# Patient Record
Sex: FEMALE | Race: WHITE | NOT HISPANIC OR LATINO | Employment: FULL TIME | URBAN - NONMETROPOLITAN AREA
[De-identification: names, ages, dates, MRNs, and addresses within clinical notes are randomized per-mention and may not be internally consistent; named-entity substitution may affect disease eponyms.]

---

## 2023-03-30 ENCOUNTER — TELEPHONE (OUTPATIENT)
Dept: PRIMARY CARE | Facility: CLINIC | Age: 23
End: 2023-03-30
Payer: COMMERCIAL

## 2023-03-30 NOTE — TELEPHONE ENCOUNTER
LAST COUPLE MONTHS  GET LIGHT HEADED ABOUT 1 WEEK PRIOR TO CYCLE AND LAST THROUGH END OF CYCLE.  PERIODS ARE HEAVIER AND HAS NAUSEA.  NAUSEA IS BAD ENOUGH SHE THROWS UP.       APPT ?

## 2023-04-02 PROBLEM — K59.04 CHRONIC IDIOPATHIC CONSTIPATION: Status: ACTIVE | Noted: 2023-04-02

## 2023-04-02 PROBLEM — R53.83 FATIGUE: Status: ACTIVE | Noted: 2023-04-02

## 2023-04-02 PROBLEM — G43.109 MIGRAINE WITH AURA AND WITHOUT STATUS MIGRAINOSUS, NOT INTRACTABLE: Status: ACTIVE | Noted: 2023-04-02

## 2023-04-02 PROBLEM — F32.A ANXIETY AND DEPRESSION: Status: ACTIVE | Noted: 2023-04-02

## 2023-04-02 PROBLEM — M25.532 LEFT WRIST PAIN: Status: ACTIVE | Noted: 2023-04-02

## 2023-04-02 PROBLEM — F41.9 ANXIETY AND DEPRESSION: Status: ACTIVE | Noted: 2023-04-02

## 2023-04-02 PROBLEM — R51.9 INTRACTABLE HEADACHE: Status: ACTIVE | Noted: 2023-04-02

## 2023-04-02 PROBLEM — R55 VASOVAGAL NEAR SYNCOPE: Status: ACTIVE | Noted: 2023-04-02

## 2023-04-02 PROBLEM — N93.9 ABNORMAL UTERINE BLEEDING: Status: ACTIVE | Noted: 2023-04-02

## 2023-04-02 PROBLEM — N97.9 INFERTILITY, FEMALE: Status: ACTIVE | Noted: 2023-04-02

## 2023-04-02 PROBLEM — T78.40XA ALLERGIC REACTION: Status: ACTIVE | Noted: 2023-04-02

## 2023-04-02 RX ORDER — RIZATRIPTAN BENZOATE 10 MG/1
10 TABLET ORAL
COMMUNITY
Start: 2021-03-19

## 2023-04-02 RX ORDER — TOPIRAMATE 25 MG/1
1 TABLET ORAL 2 TIMES DAILY
COMMUNITY
Start: 2023-01-13 | End: 2023-04-03 | Stop reason: ALTCHOICE

## 2023-04-02 RX ORDER — EPINEPHRINE 0.3 MG/.3ML
INJECTION SUBCUTANEOUS
COMMUNITY
Start: 2022-01-30

## 2023-04-02 RX ORDER — LORAZEPAM 0.5 MG/1
1 TABLET ORAL 3 TIMES DAILY PRN
COMMUNITY
Start: 2022-02-11 | End: 2023-04-03 | Stop reason: ALTCHOICE

## 2023-04-02 RX ORDER — SERTRALINE HYDROCHLORIDE 50 MG/1
1 TABLET, FILM COATED ORAL DAILY
COMMUNITY
Start: 2022-02-11 | End: 2023-04-03 | Stop reason: ALTCHOICE

## 2023-04-02 RX ORDER — MELOXICAM 15 MG/1
1 TABLET ORAL DAILY
COMMUNITY
Start: 2022-11-04 | End: 2023-04-03 | Stop reason: ALTCHOICE

## 2023-04-03 ENCOUNTER — OFFICE VISIT (OUTPATIENT)
Dept: PRIMARY CARE | Facility: CLINIC | Age: 23
End: 2023-04-03
Payer: COMMERCIAL

## 2023-04-03 ENCOUNTER — LAB (OUTPATIENT)
Dept: LAB | Facility: LAB | Age: 23
End: 2023-04-03
Payer: COMMERCIAL

## 2023-04-03 VITALS
DIASTOLIC BLOOD PRESSURE: 80 MMHG | OXYGEN SATURATION: 99 % | WEIGHT: 161.7 LBS | SYSTOLIC BLOOD PRESSURE: 138 MMHG | BODY MASS INDEX: 32.6 KG/M2 | HEART RATE: 94 BPM | HEIGHT: 59 IN

## 2023-04-03 DIAGNOSIS — R11.2 NAUSEA AND VOMITING, UNSPECIFIED VOMITING TYPE: ICD-10-CM

## 2023-04-03 DIAGNOSIS — R42 DIZZINESS: ICD-10-CM

## 2023-04-03 DIAGNOSIS — R63.5 WEIGHT GAIN: ICD-10-CM

## 2023-04-03 DIAGNOSIS — R42 DIZZINESS: Primary | ICD-10-CM

## 2023-04-03 DIAGNOSIS — N92.0 MENORRHAGIA WITH REGULAR CYCLE: ICD-10-CM

## 2023-04-03 PROBLEM — G43.109 MIGRAINE WITH AURA AND WITHOUT STATUS MIGRAINOSUS, NOT INTRACTABLE: Status: RESOLVED | Noted: 2023-04-02 | Resolved: 2023-04-03

## 2023-04-03 PROBLEM — R51.9 INTRACTABLE HEADACHE: Status: RESOLVED | Noted: 2023-04-02 | Resolved: 2023-04-03

## 2023-04-03 PROBLEM — R53.83 FATIGUE: Status: RESOLVED | Noted: 2023-04-02 | Resolved: 2023-04-03

## 2023-04-03 PROBLEM — M25.532 LEFT WRIST PAIN: Status: RESOLVED | Noted: 2023-04-02 | Resolved: 2023-04-03

## 2023-04-03 LAB
ALANINE AMINOTRANSFERASE (SGPT) (U/L) IN SER/PLAS: 10 U/L (ref 7–45)
ALBUMIN (G/DL) IN SER/PLAS: 4.6 G/DL (ref 3.4–5)
ALKALINE PHOSPHATASE (U/L) IN SER/PLAS: 89 U/L (ref 33–110)
ANION GAP IN SER/PLAS: 16 MMOL/L (ref 10–20)
ASPARTATE AMINOTRANSFERASE (SGOT) (U/L) IN SER/PLAS: 14 U/L (ref 9–39)
BASOPHILS (10*3/UL) IN BLOOD BY AUTOMATED COUNT: 0.04 X10E9/L (ref 0–0.1)
BASOPHILS/100 LEUKOCYTES IN BLOOD BY AUTOMATED COUNT: 0.5 % (ref 0–2)
BILIRUBIN TOTAL (MG/DL) IN SER/PLAS: 0.3 MG/DL (ref 0–1.2)
CALCIUM (MG/DL) IN SER/PLAS: 9.5 MG/DL (ref 8.6–10.3)
CARBON DIOXIDE, TOTAL (MMOL/L) IN SER/PLAS: 26 MMOL/L (ref 21–32)
CHLORIDE (MMOL/L) IN SER/PLAS: 102 MMOL/L (ref 98–107)
CREATININE (MG/DL) IN SER/PLAS: 0.52 MG/DL (ref 0.5–1.05)
EOSINOPHILS (10*3/UL) IN BLOOD BY AUTOMATED COUNT: 0.19 X10E9/L (ref 0–0.7)
EOSINOPHILS/100 LEUKOCYTES IN BLOOD BY AUTOMATED COUNT: 2.3 % (ref 0–6)
ERYTHROCYTE DISTRIBUTION WIDTH (RATIO) BY AUTOMATED COUNT: 15.9 % (ref 11.5–14.5)
ERYTHROCYTE MEAN CORPUSCULAR HEMOGLOBIN CONCENTRATION (G/DL) BY AUTOMATED: 29.3 G/DL (ref 32–36)
ERYTHROCYTE MEAN CORPUSCULAR VOLUME (FL) BY AUTOMATED COUNT: 68 FL (ref 80–100)
ERYTHROCYTES (10*6/UL) IN BLOOD BY AUTOMATED COUNT: 6.4 X10E12/L (ref 4–5.2)
FERRITIN (UG/LL) IN SER/PLAS: 39 UG/L (ref 8–150)
GFR FEMALE: >90 ML/MIN/1.73M2
GLUCOSE (MG/DL) IN SER/PLAS: 82 MG/DL (ref 74–99)
HEMATOCRIT (%) IN BLOOD BY AUTOMATED COUNT: 43.4 % (ref 36–46)
HEMOGLOBIN (G/DL) IN BLOOD: 12.7 G/DL (ref 12–16)
IMMATURE GRANULOCYTES/100 LEUKOCYTES IN BLOOD BY AUTOMATED COUNT: 0.4 % (ref 0–0.9)
IRON (UG/DL) IN SER/PLAS: 31 UG/DL (ref 35–150)
IRON BINDING CAPACITY (UG/DL) IN SER/PLAS: 418 UG/DL (ref 240–445)
IRON SATURATION (%) IN SER/PLAS: 7 % (ref 25–45)
LEUKOCYTES (10*3/UL) IN BLOOD BY AUTOMATED COUNT: 8.2 X10E9/L (ref 4.4–11.3)
LYMPHOCYTES (10*3/UL) IN BLOOD BY AUTOMATED COUNT: 2.45 X10E9/L (ref 1.2–4.8)
LYMPHOCYTES/100 LEUKOCYTES IN BLOOD BY AUTOMATED COUNT: 30 % (ref 13–44)
MONOCYTES (10*3/UL) IN BLOOD BY AUTOMATED COUNT: 0.44 X10E9/L (ref 0.1–1)
MONOCYTES/100 LEUKOCYTES IN BLOOD BY AUTOMATED COUNT: 5.4 % (ref 2–10)
NEUTROPHILS (10*3/UL) IN BLOOD BY AUTOMATED COUNT: 5.03 X10E9/L (ref 1.2–7.7)
NEUTROPHILS/100 LEUKOCYTES IN BLOOD BY AUTOMATED COUNT: 61.4 % (ref 40–80)
PLATELETS (10*3/UL) IN BLOOD AUTOMATED COUNT: 371 X10E9/L (ref 150–450)
POTASSIUM (MMOL/L) IN SER/PLAS: 4.6 MMOL/L (ref 3.5–5.3)
PROTEIN TOTAL: 6.9 G/DL (ref 6.4–8.2)
SODIUM (MMOL/L) IN SER/PLAS: 139 MMOL/L (ref 136–145)
THYROTROPIN (MIU/L) IN SER/PLAS BY DETECTION LIMIT <= 0.05 MIU/L: 2.55 MIU/L (ref 0.44–3.98)
UREA NITROGEN (MG/DL) IN SER/PLAS: 13 MG/DL (ref 6–23)

## 2023-04-03 PROCEDURE — 1036F TOBACCO NON-USER: CPT | Performed by: NURSE PRACTITIONER

## 2023-04-03 PROCEDURE — 83540 ASSAY OF IRON: CPT

## 2023-04-03 PROCEDURE — 80053 COMPREHEN METABOLIC PANEL: CPT

## 2023-04-03 PROCEDURE — 84443 ASSAY THYROID STIM HORMONE: CPT

## 2023-04-03 PROCEDURE — 85025 COMPLETE CBC W/AUTO DIFF WBC: CPT

## 2023-04-03 PROCEDURE — 82728 ASSAY OF FERRITIN: CPT

## 2023-04-03 PROCEDURE — 99214 OFFICE O/P EST MOD 30 MIN: CPT | Performed by: NURSE PRACTITIONER

## 2023-04-03 PROCEDURE — 36415 COLL VENOUS BLD VENIPUNCTURE: CPT

## 2023-04-03 PROCEDURE — 83550 IRON BINDING TEST: CPT

## 2023-04-03 ASSESSMENT — PATIENT HEALTH QUESTIONNAIRE - PHQ9
1. LITTLE INTEREST OR PLEASURE IN DOING THINGS: NOT AT ALL
SUM OF ALL RESPONSES TO PHQ9 QUESTIONS 1 AND 2: 0
2. FEELING DOWN, DEPRESSED OR HOPELESS: NOT AT ALL

## 2023-04-03 ASSESSMENT — ENCOUNTER SYMPTOMS
DYSURIA: 0
HEADACHES: 0
FREQUENCY: 1
WEAKNESS: 1
DIZZINESS: 1
LIGHT-HEADEDNESS: 1

## 2023-04-03 NOTE — PROGRESS NOTES
"Subjective   Patient ID: Janae RUCKER Brideweser is a 23 y.o. female who presents for Dizziness (Nausea x 1 mo. Worsens during menstrual cycle ) and Menorrhagia (X 6 mo. ).    Janae comes to the office to discuss dizziness and lightheadedness.  LH mostly in AM & w/ rapid position chgs since starting period last week. LMP: last Tues & finished yesterday. No syncopal episodes/chest pains/palpations. Feels weak in her legs.  Menses regular x 4-5D, menses heavy the first 3 days. Wears both a tampon & a pad @ same time. Chging tampon every 2H w/ infreq. Pad chgs + clots. + abd cramping. OTC: Midol. These periods have gotten more heavy over last year. Wt gain: 20-30#, no diet or physical activity chgs. No abd pain/melena. No dysuria. Drinking plenty of fluids. Dizziness & LH are worse before and while she is on her menstrual cycle.  Denies pregnancy. + sexually active. This is accompanied with nausea and had one emesis. Infreq. red meat consumption. No vitamins. Not wanting to start on OCP's.  Last Pap 2021 normal      Dizziness  Associated symptoms include weakness. Pertinent negatives include no headaches.        Review of Systems   Gastrointestinal:         + Abdominal cramping with menses   Genitourinary:  Positive for frequency and menstrual problem. Negative for dysuria and vaginal pain.   Neurological:  Positive for dizziness, weakness and light-headedness. Negative for headaches.       Objective   /80   Pulse 94   Ht 1.499 m (4' 11\")   Wt 73.3 kg (161 lb 11.2 oz)   LMP 03/28/2023 (Exact Date)   SpO2 99%   BMI 32.66 kg/m²     Physical Exam  Vitals and nursing note reviewed.   Constitutional:       Appearance: Normal appearance.   HENT:      Head: Normocephalic.   Cardiovascular:      Rate and Rhythm: Normal rate and regular rhythm.      Heart sounds: Normal heart sounds.   Pulmonary:      Effort: Pulmonary effort is normal.      Breath sounds: Normal breath sounds.   Abdominal:      General: There is no " distension.      Palpations: There is no mass.      Tenderness: There is no abdominal tenderness. There is no guarding or rebound.   Musculoskeletal:      Cervical back: Normal range of motion.   Skin:     General: Skin is warm and dry.   Neurological:      General: No focal deficit present.      Mental Status: She is alert and oriented to person, place, and time.         Assessment/Plan   Problem List Items Addressed This Visit          Digestive    Nausea and vomiting       Genitourinary    Menorrhagia with regular cycle       Endocrine/Metabolic    Weight gain    Relevant Orders    TSH with reflex to Free T4 if abnormal       Other    Dizziness - Primary    Relevant Orders    Ferritin    Iron and TIBC    CBC and Auto Differential    TSH with reflex to Free T4 if abnormal

## 2023-04-04 ENCOUNTER — TELEPHONE (OUTPATIENT)
Dept: PRIMARY CARE | Facility: CLINIC | Age: 23
End: 2023-04-04
Payer: COMMERCIAL

## 2023-04-05 ENCOUNTER — TELEPHONE (OUTPATIENT)
Dept: PRIMARY CARE | Facility: CLINIC | Age: 23
End: 2023-04-05
Payer: COMMERCIAL

## 2023-04-05 NOTE — RESULT ENCOUNTER NOTE
Call & notify patient bloodwork doesn't show any true anemia but iron studies are slightly on the low side. Have her start Slow Fe (OTC) daily x3 months. Schedule an ov in 3 months for re-evaluation or sooner if needed. We will repeat iron studies in 3 MO

## 2023-04-05 NOTE — TELEPHONE ENCOUNTER
----- Message from JOSE Peng sent at 4/5/2023  7:17 AM EDT -----  Call & notify patient bloodwork doesn't show any true anemia but iron studies are slightly on the low side. Have her start Slow Fe (OTC) daily x3 months. Schedule an ov in 3 months for re-evaluation or sooner if needed. We will repeat iron studies in 3 MO

## 2023-05-18 ENCOUNTER — TELEPHONE (OUTPATIENT)
Dept: PRIMARY CARE | Facility: CLINIC | Age: 23
End: 2023-05-18
Payer: COMMERCIAL

## 2023-05-18 NOTE — TELEPHONE ENCOUNTER
IS TAKING OTC IRON 45MG DAILY. ([employer]) COMPLAINS OF SEVERE COTTON MOUTH NOT RELIEVED BY INCREASING FLUIDS. HAS NOT TRIED ANOTHER BRAND. PLEASE ADVISE. IS NOT HAVING ANY MORE LIGHTHEADEDNESS

## 2023-10-20 ENCOUNTER — APPOINTMENT (OUTPATIENT)
Dept: URGENT CARE | Facility: CLINIC | Age: 23
End: 2023-10-20

## 2023-12-29 ENCOUNTER — HOSPITAL ENCOUNTER (EMERGENCY)
Facility: HOSPITAL | Age: 23
Discharge: HOME | End: 2023-12-29
Attending: EMERGENCY MEDICINE

## 2023-12-29 VITALS
SYSTOLIC BLOOD PRESSURE: 120 MMHG | HEIGHT: 59 IN | OXYGEN SATURATION: 100 % | HEART RATE: 96 BPM | WEIGHT: 160 LBS | RESPIRATION RATE: 16 BRPM | TEMPERATURE: 97.1 F | DIASTOLIC BLOOD PRESSURE: 80 MMHG | BODY MASS INDEX: 32.25 KG/M2

## 2023-12-29 DIAGNOSIS — J02.9 SORE THROAT: Primary | ICD-10-CM

## 2023-12-29 LAB
FLUAV RNA RESP QL NAA+PROBE: NOT DETECTED
FLUBV RNA RESP QL NAA+PROBE: NOT DETECTED
S PYO DNA THROAT QL NAA+PROBE: NOT DETECTED
SARS-COV-2 RNA RESP QL NAA+PROBE: NOT DETECTED
TSH SERPL-ACNC: 2.35 MIU/L (ref 0.44–3.98)

## 2023-12-29 PROCEDURE — 99283 EMERGENCY DEPT VISIT LOW MDM: CPT | Performed by: EMERGENCY MEDICINE

## 2023-12-29 PROCEDURE — 87502 INFLUENZA DNA AMP PROBE: CPT | Performed by: EMERGENCY MEDICINE

## 2023-12-29 PROCEDURE — 36415 COLL VENOUS BLD VENIPUNCTURE: CPT | Performed by: EMERGENCY MEDICINE

## 2023-12-29 PROCEDURE — 84443 ASSAY THYROID STIM HORMONE: CPT | Performed by: EMERGENCY MEDICINE

## 2023-12-29 PROCEDURE — 87651 STREP A DNA AMP PROBE: CPT | Performed by: EMERGENCY MEDICINE

## 2023-12-29 ASSESSMENT — PAIN SCALES - GENERAL
PAINLEVEL_OUTOF10: 4
PAINLEVEL_OUTOF10: 4

## 2023-12-29 ASSESSMENT — COLUMBIA-SUICIDE SEVERITY RATING SCALE - C-SSRS
6. HAVE YOU EVER DONE ANYTHING, STARTED TO DO ANYTHING, OR PREPARED TO DO ANYTHING TO END YOUR LIFE?: NO
2. HAVE YOU ACTUALLY HAD ANY THOUGHTS OF KILLING YOURSELF?: NO
1. IN THE PAST MONTH, HAVE YOU WISHED YOU WERE DEAD OR WISHED YOU COULD GO TO SLEEP AND NOT WAKE UP?: NO

## 2023-12-29 ASSESSMENT — PAIN - FUNCTIONAL ASSESSMENT: PAIN_FUNCTIONAL_ASSESSMENT: 0-10

## 2023-12-29 ASSESSMENT — PAIN DESCRIPTION - LOCATION: LOCATION: THROAT

## 2023-12-29 NOTE — ED PROVIDER NOTES
HPI   No chief complaint on file.      Limitations to History: None    HPI: 23-year-old female presents with left-sided throat pain.  States this began last night.  States it is more tender to the touch.  States she might had a sore throat with swallowing last night but this is resolved this morning.  Denies any fevers, chills, nausea, vomiting, chest pain, shortness of breath, abdominal pain, urinary symptoms.    ------------------------------------------------------------------------------------------------------------------------------------------  Physical Exam:    VS: As documented in the triage note and EMR flowsheet from this visit were reviewed.    Appearance: Alert. cooperative,  in no acute distress.   Skin: Intact,  dry skin, no lesions, rash, petechiae or purpura.   Eyes: PERRLA, EOMs intact,  Conjunctiva pink with no redness or exudates.   HENT: Normocephalic, atraumatic. Nares patent. No intraoral lesions.   Neck: Supple, without meningismus. Trachea at midline. No lymphadenopathy.  Tenderness to palpation along the left anterior neck.  Pulmonary: Clear bilaterally with good chest wall excursion. No rales, rhonchi or wheezing. No accessory muscle use or stridor.  Cardiac: Tachycardic and regular rhythm, no rubs, murmurs, or gallops.   Musculoskeletal: Full range of motion.  Pulses full and equal. No cyanosis, clubbing, or edema.  Psychiatric: Appropriate mood and affect.                            No data recorded                Patient History   Past Medical History:   Diagnosis Date    Other conditions influencing health status 12/22/2021    Menarche     Past Surgical History:   Procedure Laterality Date    OTHER SURGICAL HISTORY  11/11/2019    Tonsillectomy with adenoidectomy    OTHER SURGICAL HISTORY  12/22/2021    Appendectomy    OTHER SURGICAL HISTORY  07/27/2020    Appendectomy laparoscopic     Family History   Problem Relation Name Age of Onset    Breast cancer Mother      Migraines Mother       Hypertension Mother          Benign    Hypertension Paternal Grandmother          Benign    Hypertension Paternal Grandfather          Benign    Breast cancer Other      Hypertension Other          Benign     Social History     Tobacco Use    Smoking status: Never    Smokeless tobacco: Never   Substance Use Topics    Alcohol use: Not on file    Drug use: Not on file       Physical Exam   ED Triage Vitals [12/29/23 0528]   Temp Heart Rate Resp BP   36.2 °C (97.1 °F) (!) 127 15 (!) 136/92      SpO2 Temp Source Heart Rate Source Patient Position   100 % Oral -- --      BP Location FiO2 (%)     -- --       Physical Exam    ED Course & MDM   Diagnoses as of 12/29/23 0741   Sore throat       Medical Decision Making  Labs Reviewed  GROUP A STREPTOCOCCUS, PCR - Normal     Group A Strep PCR                                 TSH WITH REFLEX TO FREE T4 IF ABNORMAL - Normal     Thyroid Stimulating Hormone   2.35                       Narrative: TSH testing is performed using different testing methodology at [facility] ([facility]) than at other [facility]. Direct result comparisons should only be made within the same method.                    SARS-COV-2 PCR, SYMPTOMATIC - Normal     Coronavirus 2019, PCR                                    Narrative: This assay has received FDA Emergency Use Authorization (EUA) and is only authorized for the duration of time that circumstances exist to justify the authorization of the emergency use of in vitro diagnostic tests for the detection of SARS-CoV-2 virus and/or diagnosis of COVID-19 infection under section 564(b)(1) of the Act, 21 U.S.C. 360bbb-3(b)(1). This assay is an in vitro diagnostic nucleic acid amplification test for the qualitative detection of SARS-CoV-2 from nasopharyngeal specimens and has been validated for use at [facility]. Negative results do not preclude COVID-19 infections and should not be used as the sole basis for diagnosis, treatment,  or other management decisions.                    INFLUENZA A AND B PCR - Normal    Medical Decision Making:    Appears well and nontoxic.  Patient initially tachycardic.  Afebrile.  No swelling of the posterior pharynx.  COVID, influenza, strep negative.  TSH normal.  Patient advised on Motrin and Tylenol as needed and follow-up with primary care.  Stable at time of discharge.    Differential Diagnoses Considered: Pharyngitis, musculoskeletal pain, PTA, thyroiditis, thyroid nodule    Escalation of Care:  Appropriate for discharge and follow-up with primary care.            Procedure  Procedures     Alvaro Pat,   12/29/23 0742

## 2024-01-03 ENCOUNTER — OFFICE VISIT (OUTPATIENT)
Dept: URGENT CARE | Facility: CLINIC | Age: 24
End: 2024-01-03

## 2024-01-03 VITALS
TEMPERATURE: 98 F | HEART RATE: 103 BPM | DIASTOLIC BLOOD PRESSURE: 81 MMHG | SYSTOLIC BLOOD PRESSURE: 114 MMHG | HEIGHT: 59 IN | BODY MASS INDEX: 32.25 KG/M2 | WEIGHT: 160 LBS | OXYGEN SATURATION: 95 % | RESPIRATION RATE: 16 BRPM

## 2024-01-03 DIAGNOSIS — J06.9 URI WITH COUGH AND CONGESTION: Primary | ICD-10-CM

## 2024-01-03 PROCEDURE — 99212 OFFICE O/P EST SF 10 MIN: CPT | Mod: 25 | Performed by: PHYSICIAN ASSISTANT

## 2024-01-03 RX ORDER — PSEUDOEPHEDRINE HCL 30 MG
30 TABLET ORAL EVERY 6 HOURS PRN
Qty: 30 TABLET | Refills: 0 | Status: SHIPPED | OUTPATIENT
Start: 2024-01-03 | End: 2025-01-02

## 2024-01-03 RX ORDER — AZELASTINE 1 MG/ML
2 SPRAY, METERED NASAL 2 TIMES DAILY
Qty: 30 ML | Refills: 0 | Status: SHIPPED | OUTPATIENT
Start: 2024-01-03 | End: 2024-01-18

## 2024-01-03 RX ORDER — PROMETHAZINE HYDROCHLORIDE AND DEXTROMETHORPHAN HYDROBROMIDE 6.25; 15 MG/5ML; MG/5ML
5 SYRUP ORAL 4 TIMES DAILY PRN
Qty: 118 ML | Refills: 0 | Status: SHIPPED | OUTPATIENT
Start: 2024-01-03 | End: 2024-01-10

## 2024-01-03 RX ORDER — ALBUTEROL SULFATE 90 UG/1
2 AEROSOL, METERED RESPIRATORY (INHALATION) EVERY 6 HOURS PRN
Qty: 18 G | Refills: 0 | Status: SHIPPED | OUTPATIENT
Start: 2024-01-03 | End: 2025-01-02

## 2024-01-03 ASSESSMENT — VISUAL ACUITY: OU: 1

## 2024-01-03 NOTE — PROGRESS NOTES
Located within [facility] [place] NOTE:      Name: Janae Maria, 23 y.o.    CSN:[id number]   MRN:[id number]    PCP: Rd Conde MD    ALL:  No Known Allergies    History:    Chief Complaint: URI (SORE THROAT, BI-LAT EAR PAIN, CONGESTION, PELVIC PAIN, NO APPETITE, NAUSEA WHEN EATING, FATIGUED X 5 DAYS)    Encounter Date: 1/3/2024  10:15hrs    HPI: The history was obtained from the patient. Janae is a 23 y.o. female, who presents with a chief complaint of URI (SORE THROAT, BI-LAT EAR PAIN, CONGESTION, PELVIC PAIN, NO APPETITE, NAUSEA WHEN EATING, FATIGUED X 5 DAYS)     Has been to two different clinics for similar symptoms, mentions she's only taking IBU for pain/discomfort & URI symptoms.    PMHx:    Past Medical History:   Diagnosis Date    Other conditions influencing health status 12/22/2021    Menarche              Current Outpatient Medications   Medication Sig Dispense Refill    ferrous sulfate (SLOW FE ORAL) Take 1 tablet by mouth every other day. TO EVERY THIRD DAY PRN TOLERATES      albuterol 90 mcg/actuation inhaler Inhale 2 puffs every 6 hours if needed for wheezing. 18 g 0    azelastine (Astelin) 137 mcg (0.1 %) nasal spray Administer 2 sprays into each nostril 2 times a day for 15 days. Use in each nostril as directed 30 mL 0    EPINEPHrine 0.3 mg/0.3 mL injection syringe EPINEPHrine 0.3 MG/0.3ML Injection Solution Auto-injector   Quantity: 2  Refills: 0        Start : 30-Jan-2022   Active      pseudoephedrine (Sudafed) 30 mg tablet Take 1 tablet (30 mg) by mouth every 6 hours if needed for congestion. 30 tablet 0    rizatriptan (Maxalt) 10 mg tablet Take 1 tablet (10 mg) by mouth. At onset of headache. May repeat every 2 hours as needed. Maximum 3 tablets in 24 hours       No current facility-administered medications for this visit.         PMSx:    Past Surgical History:   Procedure Laterality Date    OTHER SURGICAL HISTORY  11/11/2019    Tonsillectomy with adenoidectomy    OTHER  SURGICAL HISTORY  12/22/2021    Appendectomy    OTHER SURGICAL HISTORY  07/27/2020    Appendectomy laparoscopic       Fam Hx:   Family History   Problem Relation Name Age of Onset    Breast cancer Mother      Migraines Mother      Hypertension Mother          Benign    Hypertension Paternal Grandmother          Benign    Hypertension Paternal Grandfather          Benign    Breast cancer Other      Hypertension Other          Benign       SOC. Hx:     Social History     Socioeconomic History    Marital status: Single     Spouse name: Not on file    Number of children: Not on file    Years of education: Not on file    Highest education level: Not on file   Occupational History    Not on file   Tobacco Use    Smoking status: Never    Smokeless tobacco: Never   Substance and Sexual Activity    Alcohol use: Not on file    Drug use: Not on file    Sexual activity: Not on file   Other Topics Concern    Not on file   Social History Narrative    Not on file     Social Determinants of Health     Financial Resource Strain: Not on file   Food Insecurity: Not on file   Transportation Needs: Not on file   Physical Activity: Not on file   Stress: Not on file   Social Connections: Not on file   Intimate Partner Violence: Not on file   Housing Stability: Not on file         Vitals:    01/03/24 1005   BP: 114/81   Pulse: 103   Resp: 16   Temp: 36.7 °C (98 °F)   SpO2: 95%     72.6 kg (160 lb)          Physical Exam  Constitutional:       Appearance: Normal appearance. She is normal weight.   HENT:      Head: Normocephalic and atraumatic.      Right Ear: Hearing and tympanic membrane normal.      Left Ear: Hearing and tympanic membrane normal.      Nose: Congestion and rhinorrhea present. Rhinorrhea is clear.      Mouth/Throat:      Lips: Pink.      Mouth: Mucous membranes are moist.   Eyes:      General: Lids are normal. Vision grossly intact.      Extraocular Movements: Extraocular movements intact.   Cardiovascular:      Rate and  Rhythm: Regular rhythm. Tachycardia present.      Comments: Tachycardia may be related to symptoms of nausea decreased appetite   Pulmonary:      Effort: Pulmonary effort is normal.      Breath sounds: Normal breath sounds.   Abdominal:      General: Abdomen is flat.   Musculoskeletal:         General: Normal range of motion.      Cervical back: Normal range of motion and neck supple.   Skin:     General: Skin is warm.   Neurological:      Mental Status: She is alert and oriented to person, place, and time.   Psychiatric:         Behavior: Behavior normal.         LABORATORY @ RADIOLOGICAL IMAGING (if done):     Patient has not and is not interested in any additional tests that she has had a COVID test earlier at her previous clinic visits which was negative 12/29/23.     COURSE/MEDICAL DECISION MAKING:    Janae is a 23 y.o., who presents with a working diagnosis of   1. URI with cough and congestion     with a differential to include: Influenza, parainfluenza, rhinovirus, adenovirus, metapneumovirus, coronavirus, COVID-19, postnasal drip, strep pharyngitis, GERD, retropharyngeal abscess, tonsillitis, adenitis, seasonal allergies      1) URI with cough/congestion: supportive care recommended, discussed use of OTC analgesics APAP/NSAID for fever/pain control, discussed hydration & when to seek re-evaluation.         Rd Herndon PA-C   Advanced Practice Provider  [facility]

## 2024-02-25 ENCOUNTER — HOSPITAL ENCOUNTER (EMERGENCY)
Facility: HOSPITAL | Age: 24
Discharge: HOME | End: 2024-02-25

## 2024-02-25 VITALS
TEMPERATURE: 98.3 F | DIASTOLIC BLOOD PRESSURE: 110 MMHG | BODY MASS INDEX: 32.25 KG/M2 | WEIGHT: 160 LBS | HEIGHT: 59 IN | OXYGEN SATURATION: 99 % | RESPIRATION RATE: 16 BRPM | SYSTOLIC BLOOD PRESSURE: 155 MMHG | HEART RATE: 112 BPM

## 2024-02-25 DIAGNOSIS — B35.4 TINEA CORPORIS: Primary | ICD-10-CM

## 2024-02-25 PROCEDURE — 99282 EMERGENCY DEPT VISIT SF MDM: CPT

## 2024-02-25 RX ORDER — PRENATAL VIT 91/IRON/FOLIC/DHA 28-975-200
COMBINATION PACKAGE (EA) ORAL 2 TIMES DAILY
Qty: 12 G | Refills: 0 | Status: SHIPPED | OUTPATIENT
Start: 2024-02-25

## 2024-02-25 NOTE — ED PROVIDER NOTES
HPI   Chief Complaint   Patient presents with    Rash     Patient complains of red, itchy spot to right breast that has been there for a few weeks, states she has been applying benedryl cream and cortisone cream to it without improvement       Patient presents with itchy rash for the few weeks.  She has noticed a singular lesion on her right chest wall near the edge of her bra.  States it initially seemed a little flaky.  Now it slightly red and continues to itch.  She has tried steroid cream and over-the-counter antibiotic cream without any improvement.  She had not noticed any other rashes.      History provided by:  Patient                      [place] Coma Scale Score: 15                     Patient History   Past Medical History:   Diagnosis Date    Other conditions influencing health status 12/22/2021    Menarche     Past Surgical History:   Procedure Laterality Date    OTHER SURGICAL HISTORY  11/11/2019    Tonsillectomy with adenoidectomy    OTHER SURGICAL HISTORY  12/22/2021    Appendectomy    OTHER SURGICAL HISTORY  07/27/2020    Appendectomy laparoscopic     Family History   Problem Relation Name Age of Onset    Breast cancer Mother      Migraines Mother      Hypertension Mother          Benign    Hypertension Paternal Grandmother          Benign    Hypertension Paternal Grandfather          Benign    Breast cancer Other      Hypertension Other          Benign     Social History     Tobacco Use    Smoking status: Never    Smokeless tobacco: Never   Substance Use Topics    Alcohol use: Not on file    Drug use: Not on file       Physical Exam   ED Triage Vitals [02/25/24 1709]   Temperature Heart Rate Respirations BP   36.8 °C (98.3 °F) (!) 112 16 (!) 155/110      Pulse Ox Temp Source Heart Rate Source Patient Position   99 % Temporal Monitor --      BP Location FiO2 (%)     -- --       Physical Exam  Vitals and nursing note reviewed.   Constitutional:       General: She is not in acute distress.      Appearance: Normal appearance. She is normal weight. She is not ill-appearing or toxic-appearing.   HENT:      Head: Normocephalic.   Eyes:      General: No scleral icterus.     Conjunctiva/sclera: Conjunctivae normal.   Chest:       Skin:     Capillary Refill: Capillary refill takes less than 2 seconds.      Findings: Rash present.   Neurological:      General: No focal deficit present.      Mental Status: She is alert and oriented to person, place, and time.      Cranial Nerves: No cranial nerve deficit.      Sensory: No sensory deficit.      Motor: No weakness.      Gait: Gait normal.   Psychiatric:         Attention and Perception: Attention and perception normal.         Mood and Affect: Mood normal.         Speech: Speech normal.         Behavior: Behavior normal. Behavior is cooperative.         Thought Content: Thought content normal.         Cognition and Memory: Cognition and memory normal.         Judgment: Judgment normal.         ED Course & MDM   Diagnoses as of 02/25/24 8309   Tinea corporis       Medical Decision Making  Patient presents with itchy rash for the few weeks.  She has noticed a singular lesion on her right chest wall near the edge of her bra.  States it initially seemed a little flaky.  Now it slightly red and continues to itch.  She has tried steroid cream and over-the-counter antibiotic cream without any improvement.  She had not noticed any other rashes.    Ddx: Rash, dermatitis, fungal, bacterial, viral, other    Rashes consistent with a tinea infection.  This would explain why the patient had no improvement with steroid and over-the-counter antibiotic cream.  We will place patient on antifungal cream.  Prescription was sent to the pharmacy although this is also available over-the-counter.  Patient encouraged to follow-up with primary care provider within the next 1 to 2 days for follow-up care.  Patient discharged home in improved stable condition    Problems Addressed:  Tinea  corporis: undiagnosed new problem with uncertain prognosis     Details: Treated with antifungal cream    Risk  OTC drugs.  Diagnosis or treatment significantly limited by social determinants of health.  Risk Details: Socioeconomic constraints and financial concerns        Procedure  Procedures     Jeanne Addison PA-C  02/25/24 2076

## 2024-03-30 ENCOUNTER — HOSPITAL ENCOUNTER (EMERGENCY)
Facility: HOSPITAL | Age: 24
Discharge: HOME | End: 2024-03-30
Attending: EMERGENCY MEDICINE

## 2024-03-30 ENCOUNTER — APPOINTMENT (OUTPATIENT)
Dept: RADIOLOGY | Facility: HOSPITAL | Age: 24
End: 2024-03-30

## 2024-03-30 VITALS
RESPIRATION RATE: 18 BRPM | HEART RATE: 100 BPM | DIASTOLIC BLOOD PRESSURE: 72 MMHG | SYSTOLIC BLOOD PRESSURE: 110 MMHG | WEIGHT: 160 LBS | HEIGHT: 59 IN | TEMPERATURE: 98.5 F | OXYGEN SATURATION: 96 % | BODY MASS INDEX: 32.25 KG/M2

## 2024-03-30 DIAGNOSIS — J18.9 PNEUMONIA OF RIGHT UPPER LOBE DUE TO INFECTIOUS ORGANISM: Primary | ICD-10-CM

## 2024-03-30 LAB
D DIMER PPP FEU-MCNC: 647 NG/ML FEU
FLUAV RNA RESP QL NAA+PROBE: NOT DETECTED
FLUBV RNA RESP QL NAA+PROBE: NOT DETECTED
RSV RNA RESP QL NAA+PROBE: NOT DETECTED
S PYO DNA THROAT QL NAA+PROBE: NOT DETECTED
SARS-COV-2 RNA RESP QL NAA+PROBE: NOT DETECTED

## 2024-03-30 PROCEDURE — 87651 STREP A DNA AMP PROBE: CPT | Performed by: EMERGENCY MEDICINE

## 2024-03-30 PROCEDURE — 99284 EMERGENCY DEPT VISIT MOD MDM: CPT | Mod: 25

## 2024-03-30 PROCEDURE — 71275 CT ANGIOGRAPHY CHEST: CPT

## 2024-03-30 PROCEDURE — 96360 HYDRATION IV INFUSION INIT: CPT

## 2024-03-30 PROCEDURE — 36415 COLL VENOUS BLD VENIPUNCTURE: CPT | Performed by: EMERGENCY MEDICINE

## 2024-03-30 PROCEDURE — 2500000004 HC RX 250 GENERAL PHARMACY W/ HCPCS (ALT 636 FOR OP/ED): Performed by: EMERGENCY MEDICINE

## 2024-03-30 PROCEDURE — 2550000001 HC RX 255 CONTRASTS: Performed by: EMERGENCY MEDICINE

## 2024-03-30 PROCEDURE — 2500000002 HC RX 250 W HCPCS SELF ADMINISTERED DRUGS (ALT 637 FOR MEDICARE OP, ALT 636 FOR OP/ED): Performed by: EMERGENCY MEDICINE

## 2024-03-30 PROCEDURE — 85379 FIBRIN DEGRADATION QUANT: CPT | Performed by: EMERGENCY MEDICINE

## 2024-03-30 PROCEDURE — 87634 RSV DNA/RNA AMP PROBE: CPT | Performed by: EMERGENCY MEDICINE

## 2024-03-30 PROCEDURE — 87636 SARSCOV2 & INF A&B AMP PRB: CPT | Performed by: EMERGENCY MEDICINE

## 2024-03-30 PROCEDURE — 71275 CT ANGIOGRAPHY CHEST: CPT | Mod: FOREIGN READ | Performed by: RADIOLOGY

## 2024-03-30 PROCEDURE — 71045 X-RAY EXAM CHEST 1 VIEW: CPT

## 2024-03-30 PROCEDURE — 71045 X-RAY EXAM CHEST 1 VIEW: CPT | Mod: FOREIGN READ | Performed by: RADIOLOGY

## 2024-03-30 RX ORDER — AZITHROMYCIN 250 MG/1
500 TABLET, FILM COATED ORAL ONCE
Status: COMPLETED | OUTPATIENT
Start: 2024-03-30 | End: 2024-03-30

## 2024-03-30 RX ORDER — AZITHROMYCIN 250 MG/1
250 TABLET, FILM COATED ORAL DAILY
Qty: 4 TABLET | Refills: 0 | Status: SHIPPED | OUTPATIENT
Start: 2024-03-31 | End: 2024-04-04

## 2024-03-30 RX ADMIN — SODIUM CHLORIDE 1000 ML: 9 INJECTION, SOLUTION INTRAVENOUS at 16:02

## 2024-03-30 RX ADMIN — IOHEXOL 68 ML: 350 INJECTION, SOLUTION INTRAVENOUS at 16:26

## 2024-03-30 RX ADMIN — AZITHROMYCIN DIHYDRATE 500 MG: 250 TABLET ORAL at 17:56

## 2024-03-30 ASSESSMENT — VISUAL ACUITY: OU: 1

## 2024-03-30 ASSESSMENT — PAIN - FUNCTIONAL ASSESSMENT: PAIN_FUNCTIONAL_ASSESSMENT: 0-10

## 2024-03-30 ASSESSMENT — PAIN SCALES - GENERAL
PAINLEVEL_OUTOF10: 0 - NO PAIN

## 2024-03-30 ASSESSMENT — COLUMBIA-SUICIDE SEVERITY RATING SCALE - C-SSRS
2. HAVE YOU ACTUALLY HAD ANY THOUGHTS OF KILLING YOURSELF?: NO
1. IN THE PAST MONTH, HAVE YOU WISHED YOU WERE DEAD OR WISHED YOU COULD GO TO SLEEP AND NOT WAKE UP?: NO
6. HAVE YOU EVER DONE ANYTHING, STARTED TO DO ANYTHING, OR PREPARED TO DO ANYTHING TO END YOUR LIFE?: NO

## 2024-03-30 ASSESSMENT — PAIN DESCRIPTION - PROGRESSION: CLINICAL_PROGRESSION: NOT CHANGED

## 2024-03-30 NOTE — ED PROVIDER NOTES
Cold symptoms, sore throat, cough, dyspnea, chest pain with coughing.  This 24-year-old white female presents to the ED with complaint of 2-week history of cold-like symptoms she states that every time she gets better her symptoms then returned.  She states that she continues to have sore throat symptoms as well as a cough that is occasionally productive she states that she feels short of breath with coughing and sometimes has chest pain that she describes as a tightness with coughing.  She denies any fever, night sweats or chills.  She denies any known ill contacts.  She states that she has been eating drinking normally.  She denies any GI issues.      History provided by:  Patient   used: No         Physical Exam  Vitals and nursing note reviewed.   Constitutional:       General: She is awake.      Appearance: Normal appearance. She is normal weight.   HENT:      Head: Normocephalic and atraumatic.      Jaw: There is normal jaw occlusion.      Right Ear: Hearing and external ear normal.      Left Ear: Hearing normal.      Nose: Congestion and rhinorrhea present.      Mouth/Throat:      Lips: Pink.      Mouth: Mucous membranes are moist.      Pharynx: Oropharynx is clear. No pharyngeal swelling, oropharyngeal exudate, posterior oropharyngeal erythema or uvula swelling.      Tonsils: No tonsillar exudate or tonsillar abscesses.   Eyes:      General: Lids are normal. Vision grossly intact.         Right eye: No discharge.         Left eye: No discharge.      Extraocular Movements: Extraocular movements intact.      Conjunctiva/sclera: Conjunctivae normal.      Pupils: Pupils are equal, round, and reactive to light.   Cardiovascular:      Rate and Rhythm: Regular rhythm. Tachycardia present.      Pulses: Normal pulses.      Heart sounds: Normal heart sounds. No murmur heard.     No friction rub. No gallop.   Pulmonary:      Effort: Pulmonary effort is normal. No respiratory distress.      Breath  sounds: Normal breath sounds. No stridor. No wheezing, rhonchi or rales.      Comments: Occassional cough noted during evaluation patient's lungs.  Chest:      Chest wall: No tenderness.   Abdominal:      General: Abdomen is flat. Bowel sounds are normal. There is no distension.      Palpations: Abdomen is soft. There is no mass.      Tenderness: There is no abdominal tenderness. There is no guarding or rebound.      Hernia: No hernia is present.      Comments: Patient has a benign abdominal exam   Musculoskeletal:         General: No swelling, tenderness, deformity or signs of injury. Normal range of motion.      Cervical back: Full passive range of motion without pain, normal range of motion and neck supple.      Right lower leg: Normal. No edema.      Left lower leg: Normal. No edema.   Skin:     General: Skin is warm and dry.      Capillary Refill: Capillary refill takes less than 2 seconds.      Coloration: Skin is not jaundiced or pale.      Findings: No bruising, erythema, lesion or rash.   Neurological:      General: No focal deficit present.      Mental Status: She is alert and oriented to person, place, and time.      GCS: GCS eye subscore is 4. GCS verbal subscore is 5. GCS motor subscore is 6.      Cranial Nerves: Cranial nerves 2-12 are intact. No cranial nerve deficit.      Sensory: Sensation is intact. No sensory deficit.      Motor: Motor function is intact. No weakness.      Coordination: Coordination normal.      Deep Tendon Reflexes: Reflexes normal.   Psychiatric:         Attention and Perception: Attention and perception normal.         Mood and Affect: Mood and affect normal.         Speech: Speech normal.         Behavior: Behavior normal. Behavior is cooperative.         Thought Content: Thought content normal.         Cognition and Memory: Cognition and memory normal.         Judgment: Judgment normal.          Labs Reviewed   D-DIMER, VTE EXCLUSION - Abnormal       Result Value    D-Dimer,  Quantitative VTE Exclusion 647 (*)     Narrative:     The VTE Exclusion D-Dimer assay is reported in ng/mL Fibrinogen Equivalent Units (FEU).    Per 's instructions for use, a value of less than 500 ng/mL (FEU) may help to exclude DVT or PE in outpatients when the assay is used with a clinical pretest probability assessment.([facility] must utilize and document eCalc 'Wells Score Deep Vein Thrombosis Risk' for DVT exclusion only. Emergency Department should utilize  Guidelines for Emergency Department Use of the VTE Exclusion D-Dimer and Clinical Pretest probability assessment model for DVT or PE exclusion.)   GROUP A STREPTOCOCCUS, PCR - Normal    Group A Strep PCR Not Detected     SARS-COV-2 PCR - Normal    Coronavirus 2019, PCR Not Detected      Narrative:     This assay has received FDA Emergency Use Authorization (EUA) and is only authorized for the duration of time that circumstances exist to justify the authorization of the emergency use of in vitro diagnostic tests for the detection of SARS-CoV-2 virus and/or diagnosis of COVID-19 infection under section 564(b)(1) of the Act, 21 U.S.C. 360bbb-3(b)(1). This assay is an in vitro diagnostic nucleic acid amplification test for the qualitative detection of SARS-CoV-2 from nasopharyngeal specimens and has been validated for use at [facility]. Negative results do not preclude COVID-19 infections and should not be used as the sole basis for diagnosis, treatment, or other management decisions.     INFLUENZA A AND B PCR - Normal    Flu A Result Not Detected      Flu B Result Not Detected      Narrative:     This assay is an in vitro diagnostic multiplex nucleic acid amplification test for the detection and discrimination of Influenza A & B from nasopharyngeal specimens, and has been validated for use at [facility]. Negative results do not preclude Influenza A/B infections, and should not be used as the sole basis  for diagnosis, treatment, or other management decisions. If Influenza A/B and RSV PCR results are negative, testing for Parainfluenza virus, Adenovirus and Metapneumovirus is routinely performed for [facility] pediatric oncology and intensive care inpatients, and is available on other patients by placing an add-on request.   RSV PCR - Normal    RSV PCR Not Detected      Narrative:     This assay is an FDA-cleared, in vitro diagnostic nucleic acid amplification test for the detection of RSV from nasopharyngeal specimens, and has been validated for use at [facility]. Negative results do not preclude RSV infections, and should not be used as the sole basis for diagnosis, treatment, or other management decisions. If Influenza A/B and RSV PCR results are negative, testing for Parainfluenza virus, Adenovirus and Metapneumovirus is routinely performed for pediatric oncology and intensive care inpatients at [facility], and is available on other patients by placing an add-on request.            CT angio chest for pulmonary embolism   Final Result   No evidence of pulmonary embolism.   Multiple right-sided pulmonary infiltrates.   Signed by Tino Ramírez MD      XR chest 1 view   Final Result   No radiographic evidence of acute cardiopulmonary disease.   Signed by Tino Ramírez MD           Procedures     Medical Decision Making  Patient was seen and evaluated complains of cough congestion with dyspnea.  She was noted be tachycardic and orthostatic vital signs were normal patient's D-dimer was elevated.  Patient's testing for RSV COVID and influenza was negative.  Chest x-ray revealed no acute disease process but CT of the chest was performed due to the elevated D-dimer which revealed no evidence of pulmonary embolism but revealed evidence of multiple right-sided pulmonary infiltrates.  The patient was then treated for pneumonia with 500 mg of Zithromax orally prior to being discharged home with a prescription for  Zithromax.  I did have a detailed discussion with the patient concerning her lab and x-ray results prior to discharging patient home.         Diagnoses as of 03/31/24 0751   Pneumonia of right upper lobe due to infectious organism                    Eligio Elizabeth,   03/31/24 0751

## 2024-04-03 ENCOUNTER — APPOINTMENT (OUTPATIENT)
Dept: PRIMARY CARE | Facility: CLINIC | Age: 24
End: 2024-04-03

## 2024-06-25 ENCOUNTER — APPOINTMENT (OUTPATIENT)
Dept: RADIOLOGY | Facility: HOSPITAL | Age: 24
End: 2024-06-25

## 2024-06-25 ENCOUNTER — HOSPITAL ENCOUNTER (EMERGENCY)
Facility: HOSPITAL | Age: 24
Discharge: HOME | End: 2024-06-25

## 2024-06-25 VITALS
TEMPERATURE: 97.3 F | DIASTOLIC BLOOD PRESSURE: 76 MMHG | SYSTOLIC BLOOD PRESSURE: 115 MMHG | WEIGHT: 160 LBS | HEIGHT: 59 IN | HEART RATE: 99 BPM | RESPIRATION RATE: 20 BRPM | BODY MASS INDEX: 32.25 KG/M2 | OXYGEN SATURATION: 97 %

## 2024-06-25 DIAGNOSIS — R51.9 NONINTRACTABLE HEADACHE, UNSPECIFIED CHRONICITY PATTERN, UNSPECIFIED HEADACHE TYPE: Primary | ICD-10-CM

## 2024-06-25 DIAGNOSIS — H53.8 BLURRY VISION, RIGHT EYE: ICD-10-CM

## 2024-06-25 DIAGNOSIS — H11.431 CONJUNCTIVAL INJECTION, RIGHT: ICD-10-CM

## 2024-06-25 PROCEDURE — 2500000004 HC RX 250 GENERAL PHARMACY W/ HCPCS (ALT 636 FOR OP/ED): Performed by: PHYSICIAN ASSISTANT

## 2024-06-25 PROCEDURE — 70450 CT HEAD/BRAIN W/O DYE: CPT | Performed by: RADIOLOGY

## 2024-06-25 PROCEDURE — 99284 EMERGENCY DEPT VISIT MOD MDM: CPT | Mod: 25

## 2024-06-25 PROCEDURE — 70450 CT HEAD/BRAIN W/O DYE: CPT

## 2024-06-25 PROCEDURE — 2500000005 HC RX 250 GENERAL PHARMACY W/O HCPCS: Performed by: PHYSICIAN ASSISTANT

## 2024-06-25 PROCEDURE — 96374 THER/PROPH/DIAG INJ IV PUSH: CPT

## 2024-06-25 PROCEDURE — 2500000001 HC RX 250 WO HCPCS SELF ADMINISTERED DRUGS (ALT 637 FOR MEDICARE OP): Performed by: PHYSICIAN ASSISTANT

## 2024-06-25 PROCEDURE — 96375 TX/PRO/DX INJ NEW DRUG ADDON: CPT

## 2024-06-25 RX ORDER — PROCHLORPERAZINE EDISYLATE 5 MG/ML
10 INJECTION INTRAMUSCULAR; INTRAVENOUS ONCE
Status: COMPLETED | OUTPATIENT
Start: 2024-06-25 | End: 2024-06-25

## 2024-06-25 RX ORDER — IBUPROFEN 200 MG
400 TABLET ORAL EVERY 6 HOURS PRN
COMMUNITY

## 2024-06-25 RX ORDER — TETRACAINE HYDROCHLORIDE 5 MG/ML
1 SOLUTION OPHTHALMIC ONCE
Status: COMPLETED | OUTPATIENT
Start: 2024-06-25 | End: 2024-06-25

## 2024-06-25 RX ORDER — KETOROLAC TROMETHAMINE 30 MG/ML
30 INJECTION, SOLUTION INTRAMUSCULAR; INTRAVENOUS ONCE
Status: COMPLETED | OUTPATIENT
Start: 2024-06-25 | End: 2024-06-25

## 2024-06-25 RX ORDER — PROCHLORPERAZINE EDISYLATE 5 MG/ML
INJECTION INTRAMUSCULAR; INTRAVENOUS
Status: COMPLETED
Start: 2024-06-25 | End: 2024-06-25

## 2024-06-25 RX ORDER — METOCLOPRAMIDE HYDROCHLORIDE 5 MG/ML
10 INJECTION INTRAMUSCULAR; INTRAVENOUS ONCE
Status: DISCONTINUED | OUTPATIENT
Start: 2024-06-25 | End: 2024-06-25

## 2024-06-25 RX ORDER — DIPHENHYDRAMINE HYDROCHLORIDE 50 MG/ML
50 INJECTION INTRAMUSCULAR; INTRAVENOUS ONCE
Status: DISCONTINUED | OUTPATIENT
Start: 2024-06-25 | End: 2024-06-25

## 2024-06-25 ASSESSMENT — ENCOUNTER SYMPTOMS
PALPITATIONS: 0
VOMITING: 0
COUGH: 0
HEMATURIA: 0
SORE THROAT: 0
FEVER: 0
SHORTNESS OF BREATH: 0
HEADACHES: 1
PHOTOPHOBIA: 1
DYSURIA: 0
EYE PAIN: 0
BACK PAIN: 0
COLOR CHANGE: 0
CHILLS: 0
SEIZURES: 0
ARTHRALGIAS: 0
ABDOMINAL PAIN: 0
EYE REDNESS: 1

## 2024-06-25 ASSESSMENT — VISUAL ACUITY
OD: 20/200
OU: 20/25
OS: 20/25

## 2024-06-25 ASSESSMENT — PAIN DESCRIPTION - LOCATION: LOCATION: HEAD

## 2024-06-25 ASSESSMENT — PAIN - FUNCTIONAL ASSESSMENT: PAIN_FUNCTIONAL_ASSESSMENT: 0-10

## 2024-06-25 ASSESSMENT — PAIN SCALES - GENERAL
PAINLEVEL_OUTOF10: 9
PAINLEVEL_OUTOF10: 8

## 2024-06-25 ASSESSMENT — PAIN DESCRIPTION - ORIENTATION: ORIENTATION: RIGHT

## 2024-06-25 ASSESSMENT — PAIN DESCRIPTION - PAIN TYPE: TYPE: ACUTE PAIN

## 2024-06-25 NOTE — ED PROVIDER NOTES
Patient is a 24-year-old female who presents to the emergency room with a chief complaint of a headache with onset 2 days ago.  She states that when her headache began it was a 1-2 out of 10 and progressively worsened over the past few days.  She denies it being the worst headache of her life or thunderclap headache.  She states that her pain currently is a 9 out of 10.  States that her headache is mainly right sided.  She states that yesterday her right eye turned red and she developed some cloudy vision.  She is a contact wearer, removed her contact from her right eye still has her left eye contact in.  She has been using OTC eyedrops for the redness without any relief.  She denies any fever or chills.  No injury to her eye.  She states that this happened approximately 3 months ago but symptoms resolved on their own.  She does report that she is not the best at taking out her contacts and wears them for couple days at a time           Review of Systems   Constitutional:  Negative for chills and fever.   HENT:  Negative for ear pain and sore throat.    Eyes:  Positive for photophobia and redness. Negative for pain and visual disturbance.   Respiratory:  Negative for cough and shortness of breath.    Cardiovascular:  Negative for chest pain and palpitations.   Gastrointestinal:  Negative for abdominal pain and vomiting.   Genitourinary:  Negative for dysuria and hematuria.   Musculoskeletal:  Negative for arthralgias and back pain.   Skin:  Negative for color change and rash.   Neurological:  Positive for headaches. Negative for seizures and syncope.   All other systems reviewed and are negative.       Physical Exam  Vitals and nursing note reviewed.   Constitutional:       General: She is not in acute distress.     Appearance: She is well-developed.   HENT:      Head: Normocephalic and atraumatic.   Eyes:      General: No visual field deficit.     Extraocular Movements: Extraocular movements intact.      Right eye:  Normal extraocular motion.      Left eye: Normal extraocular motion.      Conjunctiva/sclera:      Right eye: Right conjunctiva is injected. No chemosis, exudate or hemorrhage.     Left eye: Left conjunctiva is not injected. No chemosis, exudate or hemorrhage.     Pupils: Pupils are equal, round, and reactive to light.      Right eye: Fluorescein uptake present. Haritha exam negative.      Funduscopic exam:     Right eye: No hemorrhage, exudate, AV nicking or papilledema. Red reflex present.      Comments: Increased uptake concerning for corneal ulcer. No dendritic lesion. Intraocular pressure: Right eye is 16.7 and Left eye 11.1   Cardiovascular:      Rate and Rhythm: Normal rate and regular rhythm.      Heart sounds: No murmur heard.  Pulmonary:      Effort: Pulmonary effort is normal. No respiratory distress.      Breath sounds: Normal breath sounds.   Abdominal:      Palpations: Abdomen is soft.      Tenderness: There is no abdominal tenderness.   Musculoskeletal:         General: No swelling.      Cervical back: Neck supple.   Skin:     General: Skin is warm and dry.      Capillary Refill: Capillary refill takes less than 2 seconds.   Neurological:      Mental Status: She is alert.      GCS: GCS eye subscore is 4. GCS verbal subscore is 5. GCS motor subscore is 6.      Cranial Nerves: Cranial nerves 2-12 are intact. No cranial nerve deficit, dysarthria or facial asymmetry.      Motor: Motor function is intact.      Coordination: Coordination is intact. Coordination normal.   Psychiatric:         Mood and Affect: Mood normal.          Labs Reviewed - No data to display     CT head wo IV contrast   Final Result   No sign of acute intracranial abnormality.             MACRO:   None        Signed by: Chidi Abdalla 6/25/2024 1:28 PM   Dictation workstation:   [id number]           Procedures     Medical Decision Making  Patient is a 24-year-old female who presents to the emergency room with a chief complaint  of a headache and right eye redness and blurred vision.  She does have a history of migraine headaches, reports she has not had one for approximately 2 years except for an episode about 3 months ago which she reported a headache and redness to her eye as well that spontaneously resolved.  Given no recent episodes CT scan of the head performed which shows no acute abnormality.  She was given IV fluids, Toradol and Compazine and reports that her headache went from a 9 out of 10 to a 1 out of 10.  She is extremely photophobic to her right eye, it is injected and erythematous.  Extraocular movements are intact and pupils are equal and reactive.  She does wear contacts and currently does not have her contact in her right eye. Visual acuity to right eye is 20/200, left eye is 20/25.  Extraocular pressure performed in both eyes, right eye is 16.7 and left eye is 11.1.  Woods lamp exam performed and I stained with fluorescein.  I do suspect patient has a corneal ulcer as there was increased uptake but was not completely confident in this diagnosis.  Given patient's presentation of her headache, eye redness, and blurred vision I consulted with ophthalmology, Dr. Rea who would like the patient sent directly to his office for evaluation.  This was discussed with patient, she is agreeable and states that she will go directly to his office.    Differnetial diagnosis includes but not limited to: Corneal abrasion, corneal ulcer, migraine headache, optic neuritis, glaucoma, IIH    Amount and/or Complexity of Data Reviewed  Radiology: ordered. Decision-making details documented in ED Course.         Diagnoses as of 06/25/24 1450   Nonintractable headache, unspecified chronicity pattern, unspecified headache type   Blurry vision, right eye   Conjunctival injection, right                    Amber Rodgers PA-C  06/25/24 1451

## 2024-09-25 ENCOUNTER — OFFICE VISIT (OUTPATIENT)
Dept: URGENT CARE | Facility: CLINIC | Age: 24
End: 2024-09-25

## 2024-09-25 VITALS
OXYGEN SATURATION: 95 % | HEART RATE: 103 BPM | HEIGHT: 59 IN | BODY MASS INDEX: 32.25 KG/M2 | WEIGHT: 160 LBS | RESPIRATION RATE: 16 BRPM | TEMPERATURE: 98 F | DIASTOLIC BLOOD PRESSURE: 74 MMHG | SYSTOLIC BLOOD PRESSURE: 125 MMHG

## 2024-09-25 DIAGNOSIS — B00.9 FACIAL HERPETIC LESIONS: Primary | ICD-10-CM

## 2024-09-25 PROCEDURE — 87801 DETECT AGNT MULT DNA AMPLI: CPT

## 2024-09-25 PROCEDURE — 99212 OFFICE O/P EST SF 10 MIN: CPT | Performed by: PHYSICIAN ASSISTANT

## 2024-09-25 RX ORDER — VALACYCLOVIR HYDROCHLORIDE 1 G/1
2000 TABLET, FILM COATED ORAL 2 TIMES DAILY
Qty: 28 TABLET | Refills: 0 | Status: SHIPPED | OUTPATIENT
Start: 2024-09-25 | End: 2024-10-02

## 2024-09-25 RX ORDER — ONDANSETRON 4 MG/1
4 TABLET, ORALLY DISINTEGRATING ORAL EVERY 8 HOURS PRN
Qty: 12 TABLET | Refills: 0 | Status: SHIPPED | OUTPATIENT
Start: 2024-09-25 | End: 2024-09-28

## 2024-09-25 ASSESSMENT — VISUAL ACUITY: OU: 1

## 2024-09-25 NOTE — PROGRESS NOTES
[facility]   CHELY NOTE:      Name: Janae Maria, 24 y.o.    CSN:[id number]   MRN:[id number]    PCP: Rd Conde MD    ALL:    Allergies   Allergen Reactions    Raspberry Rash    Red Dye Rash    Strawberry Rash    Watermelon Rash       History:    Chief Complaint: Sinus Problem (Sinus pressure/congestion, fatigue, nose bleeds x 2, nausea x 4 days )    Encounter Date: 9/25/2024      HPI: The history was obtained from the patient. Janae is a 24 y.o. female, who presents with a chief complaint of Sinus Problem (Sinus pressure/congestion, fatigue, nose bleeds x 2, nausea x 4 days ) Symptoms started quite rapidly, patient has had lesions to the chin upper lip and nostrils and then she is got facial pain and some nausea associated with it this all began over the weekend.  She denies any exertional dyspnea.    PMHx:    Past Medical History:   Diagnosis Date    Other conditions influencing health status 12/22/2021    Menarche              Current Outpatient Medications   Medication Sig Dispense Refill    albuterol 90 mcg/actuation inhaler Inhale 2 puffs every 6 hours if needed for wheezing. (Patient not taking: Reported on 9/25/2024) 18 g 0    azelastine (Astelin) 137 mcg (0.1 %) nasal spray Administer 2 sprays into each nostril 2 times a day for 15 days. Use in each nostril as directed 30 mL 0    ibuprofen 200 mg tablet Take 2 tablets (400 mg) by mouth every 6 hours if needed for mild pain (1 - 3).      ondansetron ODT (Zofran-ODT) 4 mg disintegrating tablet Take 1 tablet (4 mg) by mouth every 8 hours if needed for nausea or vomiting for up to 3 days. 12 tablet 0    pseudoephedrine (Sudafed) 30 mg tablet Take 1 tablet (30 mg) by mouth every 6 hours if needed for congestion. (Patient not taking: Reported on 9/25/2024) 30 tablet 0    terbinafine (LamISIL) 1 % cream Apply topically 2 times a day. Apply twice daily until rash disappears.  This can take a few weeks (Patient not taking:  Reported on 9/25/2024) 12 g 0    valACYclovir (Valtrex) 1 gram tablet Take 2 tablets (2,000 mg) by mouth 2 times a day for 7 days. 28 tablet 0     No current facility-administered medications for this visit.         PMSx:    Past Surgical History:   Procedure Laterality Date    OTHER SURGICAL HISTORY  11/11/2019    Tonsillectomy with adenoidectomy    OTHER SURGICAL HISTORY  12/22/2021    Appendectomy    OTHER SURGICAL HISTORY  07/27/2020    Appendectomy laparoscopic       Fam Hx:   Family History   Problem Relation Name Age of Onset    Breast cancer Mother      Migraines Mother      Hypertension Mother          Benign    Hypertension Paternal Grandmother          Benign    Hypertension Paternal Grandfather          Benign    Breast cancer Other      Hypertension Other          Benign       SOC. Hx:     Social History     Socioeconomic History    Marital status: Single     Spouse name: Not on file    Number of children: Not on file    Years of education: Not on file    Highest education level: Not on file   Occupational History    Not on file   Tobacco Use    Smoking status: Never    Smokeless tobacco: Never   Substance and Sexual Activity    Alcohol use: Not on file    Drug use: Not on file    Sexual activity: Not on file   Other Topics Concern    Not on file   Social History Narrative    Not on file     Social Determinants of Health     Financial Resource Strain: Not on file   Food Insecurity: Not on file   Transportation Needs: Not on file   Physical Activity: Not on file   Stress: Not on file   Social Connections: Not on file   Intimate Partner Violence: Not on file   Housing Stability: Not on file         Vitals:    09/25/24 1524   BP: 125/74   Pulse: 103   Resp: 16   Temp: 36.7 °C (98 °F)   SpO2: 95%     72.6 kg (160 lb)          Physical Exam  Vitals reviewed.   Constitutional:       Appearance: Normal appearance. She is normal weight.   HENT:      Head: Normocephalic and atraumatic.      Nose: Nasal  tenderness and mucosal edema present. No rhinorrhea.      Right Nostril: No septal hematoma or occlusion.      Left Nostril: No septal hematoma or occlusion.      Right Turbinates: Enlarged and swollen.      Left Turbinates: Enlarged and swollen.      Right Sinus: Maxillary sinus tenderness and frontal sinus tenderness present.      Left Sinus: Maxillary sinus tenderness and frontal sinus tenderness present.      Mouth/Throat:      Mouth: Mucous membranes are moist.   Eyes:      General: Lids are normal. Vision grossly intact.      Extraocular Movements: Extraocular movements intact.      Comments: No visual sign of any lid or ocular lesions on exam   Cardiovascular:      Rate and Rhythm: Normal rate and regular rhythm.   Pulmonary:      Effort: Pulmonary effort is normal.      Breath sounds: Normal breath sounds.   Abdominal:      General: Abdomen is flat.   Musculoskeletal:         General: Normal range of motion.      Cervical back: Normal range of motion and neck supple.   Skin:     General: Skin is warm.      Capillary Refill: Capillary refill takes less than 2 seconds.   Neurological:      Mental Status: She is alert and oriented to person, place, and time.   Psychiatric:         Behavior: Behavior normal.             ____________________________________________________________________    I did personally review Janae's past medical history, surgical history, social history, as well as family history (when relevant).  In this case, I also oversaw the her drug management by reviewing her medication list, allergy list, as well as the medications that I prescribed during the UC course and/or recommended as an out-patient (including possible OTC medications such as acetaminophen, NSAIDs , etc).    After reviewing the items above, I did look at previous medical documentation, such as recent hospitalizations, office visits, and/or recent consultations with PCP/specialist.                          SDOH:   Another  factor that I considered in Janae's care was her Social Determinants of Health (SDOH). During this  encounter, she did not have social determinants of health. Those SDOH influencing Janae's care are: none      _____________________________________________________________________       COURSE/MEDICAL DECISION MAKING:    Janae is a 24 y.o., who presents with a working diagnosis of   1. Facial herpetic lesions     with a differential to include: HSV, impetigo, VZV, abrasion    Current plan is to perform HSV/VZV testing, initiate treatment with valacyclovir if negative then we will resort to treatment with antistaph med, she was agreeable reassured and discharged.        Rd Herndon PA-C   Advanced Practice Provider  [facility]

## 2024-09-26 LAB
HSV1 DNA SKIN QL NAA+PROBE: DETECTED
HSV2 DNA SKIN QL NAA+PROBE: NOT DETECTED
VZV DNA SPEC QL NAA+PROBE: NOT DETECTED